# Patient Record
(demographics unavailable — no encounter records)

---

## 2025-01-06 NOTE — HISTORY OF PRESENT ILLNESS
[FreeTextEntry8] :  CC: Right shoulder pain and lesion on back HPI the patient is a 59-year-old female with history of ADHD off meds, history of depression currently on no meds, mild to moderate aortic insufficiency, history of rosacea history of hidradenitis suppurativa status post surgery in groin, history of isolated elevated blood pressure who presents for 2-week history of right shoulder pain with abduction denies trauma swelling redness also complains of lesion on back.. Patient's psychologist also noted some abnormal eye movement and depersonalization regarding possible seizure disorder

## 2025-01-06 NOTE — ASSESSMENT
[FreeTextEntry1] : Patient is a healthy 59-year-old female with history of high adenitis a perfect, history of rosacea, sun-damaged skin, mild to moderate aortic insufficiency, history of ADHD, history of depression, history of isolated elevated blood pressure, who presents for acute onset of right shoulder pain and lesion on back with possible underlying grief reaction versus dysthymia  1.  Right shoulder pain Appears to be tendinopathy less likely frozen shoulder\ Trial of nonsteroidals diclofenac 75 twice daily and ice range of motion exercises If fails to improve check x-ray  2.  Lesion on back Will refer to Derm given history of melanoma in the family  3.  History of mild to moderate AI Observe asymptomatic  4.  Grief reaction versus dysthymia Patient grieving father  1 year ago will observe having marital issues  5.  Sun damage skin Counseled on sunscreen protection follow-up with dermatology  6 health maintenance Flu shot today CPE in 2 to 4 weeks

## 2025-01-06 NOTE — PHYSICAL EXAM
[No Joint Swelling] : no joint swelling [Normal] : affect was normal and insight and judgment were intact [de-identified] : Papular lesion on back with excoriation [de-identified] : Tenderness with active motion of the shoulder minimum with passive full range of motion

## 2025-01-06 NOTE — REVIEW OF SYSTEMS
[Joint Pain] : joint pain [Joint Stiffness] : no joint stiffness [Joint Swelling] : no joint swelling [Muscle Pain] : no muscle pain [Back Pain] : no back pain [Itching] : Itching [Mole Changes] : no mole changes [Nail Changes] : no nail changes [Hair Changes] : no hair changes [Skin Rash] : no skin rash [Negative] : Psychiatric

## 2025-02-18 NOTE — ASSESSMENT
[FreeTextEntry1] : The patient is a healthy 59-year-old female with history of depression, mild AI, rosacea who presents with 10-day history of cough  1.  Postviral cough Rest fluids Suppressed with Tessalon 200 mg 3 times daily as needed if fails to improve call back

## 2025-02-18 NOTE — HISTORY OF PRESENT ILLNESS
[FreeTextEntry8] :   CC: 10-day history of nonproductive cough HPI the patient is a healthy 59-year-old female with history of mild AI, isolated elevated blood pressure, history of depression, rosacea, who presents with 10-day history of productive cough started [employer] Sunday progressed on following Saturday took a Z-Raffaele with improvement now presents with persistent dry hacking cough.  Denies shortness of breath fever chills COVID tested negative.